# Patient Record
Sex: FEMALE | Race: WHITE | NOT HISPANIC OR LATINO | Employment: FULL TIME | URBAN - METROPOLITAN AREA
[De-identification: names, ages, dates, MRNs, and addresses within clinical notes are randomized per-mention and may not be internally consistent; named-entity substitution may affect disease eponyms.]

---

## 2023-04-13 ENCOUNTER — OFFICE VISIT (OUTPATIENT)
Dept: OBSTETRICS AND GYNECOLOGY | Facility: CLINIC | Age: 63
End: 2023-04-13
Payer: COMMERCIAL

## 2023-04-13 VITALS
HEART RATE: 83 BPM | DIASTOLIC BLOOD PRESSURE: 75 MMHG | SYSTOLIC BLOOD PRESSURE: 159 MMHG | WEIGHT: 164 LBS | BODY MASS INDEX: 27.32 KG/M2 | HEIGHT: 65 IN

## 2023-04-13 DIAGNOSIS — N95.2 VAGINAL ATROPHY: Primary | ICD-10-CM

## 2023-04-13 PROCEDURE — 3078F PR MOST RECENT DIASTOLIC BLOOD PRESSURE < 80 MM HG: ICD-10-PCS | Mod: CPTII,S$GLB,, | Performed by: OBSTETRICS & GYNECOLOGY

## 2023-04-13 PROCEDURE — 3078F DIAST BP <80 MM HG: CPT | Mod: CPTII,S$GLB,, | Performed by: OBSTETRICS & GYNECOLOGY

## 2023-04-13 PROCEDURE — 3008F PR BODY MASS INDEX (BMI) DOCUMENTED: ICD-10-PCS | Mod: CPTII,S$GLB,, | Performed by: OBSTETRICS & GYNECOLOGY

## 2023-04-13 PROCEDURE — 1159F MED LIST DOCD IN RCRD: CPT | Mod: CPTII,S$GLB,, | Performed by: OBSTETRICS & GYNECOLOGY

## 2023-04-13 PROCEDURE — 4010F PR ACE/ARB THEARPY RXD/TAKEN: ICD-10-PCS | Mod: CPTII,S$GLB,, | Performed by: OBSTETRICS & GYNECOLOGY

## 2023-04-13 PROCEDURE — 3077F SYST BP >= 140 MM HG: CPT | Mod: CPTII,S$GLB,, | Performed by: OBSTETRICS & GYNECOLOGY

## 2023-04-13 PROCEDURE — 4010F ACE/ARB THERAPY RXD/TAKEN: CPT | Mod: CPTII,S$GLB,, | Performed by: OBSTETRICS & GYNECOLOGY

## 2023-04-13 PROCEDURE — 3077F PR MOST RECENT SYSTOLIC BLOOD PRESSURE >= 140 MM HG: ICD-10-PCS | Mod: CPTII,S$GLB,, | Performed by: OBSTETRICS & GYNECOLOGY

## 2023-04-13 PROCEDURE — 3008F BODY MASS INDEX DOCD: CPT | Mod: CPTII,S$GLB,, | Performed by: OBSTETRICS & GYNECOLOGY

## 2023-04-13 PROCEDURE — 99203 PR OFFICE/OUTPT VISIT, NEW, LEVL III, 30-44 MIN: ICD-10-PCS | Mod: S$GLB,,, | Performed by: OBSTETRICS & GYNECOLOGY

## 2023-04-13 PROCEDURE — 1159F PR MEDICATION LIST DOCUMENTED IN MEDICAL RECORD: ICD-10-PCS | Mod: CPTII,S$GLB,, | Performed by: OBSTETRICS & GYNECOLOGY

## 2023-04-13 PROCEDURE — 99203 OFFICE O/P NEW LOW 30 MIN: CPT | Mod: S$GLB,,, | Performed by: OBSTETRICS & GYNECOLOGY

## 2023-04-13 RX ORDER — GABAPENTIN 100 MG/1
CAPSULE ORAL
COMMUNITY

## 2023-04-13 RX ORDER — SUMATRIPTAN SUCCINATE 100 MG/1
TABLET ORAL
COMMUNITY

## 2023-04-13 RX ORDER — ESTRADIOL 10 UG/1
INSERT VAGINAL
COMMUNITY
Start: 2022-12-19 | End: 2023-04-13 | Stop reason: SDUPTHER

## 2023-04-13 RX ORDER — METOPROLOL SUCCINATE 50 MG/1
TABLET, EXTENDED RELEASE ORAL
COMMUNITY
Start: 2023-03-30

## 2023-04-13 RX ORDER — LISINOPRIL 20 MG/1
TABLET ORAL
COMMUNITY
Start: 2023-03-30

## 2023-04-13 RX ORDER — CETIRIZINE HYDROCHLORIDE 10 MG/1
TABLET ORAL
COMMUNITY

## 2023-04-13 RX ORDER — PANTOPRAZOLE SODIUM 40 MG/1
TABLET, DELAYED RELEASE ORAL
COMMUNITY
Start: 2023-03-21

## 2023-04-13 RX ORDER — ALPRAZOLAM 0.25 MG/1
0.25 TABLET ORAL NIGHTLY
COMMUNITY
Start: 2023-03-10

## 2023-04-13 RX ORDER — VENLAFAXINE HYDROCHLORIDE 75 MG/1
75 CAPSULE, EXTENDED RELEASE ORAL
COMMUNITY
Start: 2023-02-02 | End: 2023-12-28 | Stop reason: SDUPTHER

## 2023-04-13 RX ORDER — ESTRADIOL 10 UG/1
10 INSERT VAGINAL
Qty: 24 TABLET | Refills: 2 | Status: SHIPPED | OUTPATIENT
Start: 2023-04-13 | End: 2023-10-05 | Stop reason: SDUPTHER

## 2023-04-13 NOTE — PROGRESS NOTES
Subjective:       Patient ID: Gaby Jaquez is a 62 y.o. female.    Chief Complaint:  Well Woman      History of Present Illness  She is doing well.  No new health issues,  No abnormal bleeding, No GI or Gu concerns,  No dyspareunia.   She is doing a vaginal insert  she is on effexor for hot flashes.   She however did have a heart concern but not a heart attach.  She had a grand child die from trisomy 18  she is doing well with an increase in the effexor to 75 mg now  she is on blood pressure meds  she is on lisinipril    and increase her metoprolol    No vag bleeding or  spotting.    She does not need an exam today     HPI      GYN & OB History  No LMP recorded. Patient is postmenopausal.     Date of Last Pap: No result found    OB History   No obstetric history on file.       Review of Systems  Review of Systems   Constitutional:  Negative for activity change.   Eyes:  Negative for visual disturbance.   Respiratory:  Negative for shortness of breath.    Cardiovascular:  Negative for chest pain.   Gastrointestinal:  Negative for abdominal pain.   Genitourinary:  Negative for vaginal bleeding.        No abnormal vaginal bleeding   Musculoskeletal:  Negative for back pain.   Integumentary:  Negative for rash and breast mass.   Neurological:  Negative for numbness.   Psychiatric/Behavioral:          No mood disturbance or changes    Breast: Negative for mass          Objective:    Physical Exam:   Constitutional: She is oriented to person, place, and time. She appears well-developed.                           Neurological: She is alert and oriented to person, place, and time. She has normal reflexes.     Psychiatric: She has a normal mood and affect. Her behavior is normal. Judgment and thought content normal.        Assessment:        1. Vaginal atrophy               Plan:         Rnt for her annual in 6 months     Mammogram  Follow up one year or sooner if needed  Self breast exams  Consider health profile if  labs not done with PCP  Recommend colonoscopy as indicated  Bone density discussed   Pt is aware we call all results. If she does not hear from our office regarding her result within a week of having a study or procedure performed she is to call the office so that we can research the result for her as I do not know when she is scheduling her procedures.   Chaperone was present

## 2023-10-05 ENCOUNTER — OFFICE VISIT (OUTPATIENT)
Dept: OBSTETRICS AND GYNECOLOGY | Facility: CLINIC | Age: 63
End: 2023-10-05
Payer: COMMERCIAL

## 2023-10-05 VITALS
DIASTOLIC BLOOD PRESSURE: 74 MMHG | BODY MASS INDEX: 27.29 KG/M2 | WEIGHT: 164 LBS | HEART RATE: 77 BPM | SYSTOLIC BLOOD PRESSURE: 129 MMHG

## 2023-10-05 DIAGNOSIS — Z12.31 BREAST CANCER SCREENING BY MAMMOGRAM: ICD-10-CM

## 2023-10-05 DIAGNOSIS — Z01.419 WELL WOMAN EXAM WITH ROUTINE GYNECOLOGICAL EXAM: Primary | ICD-10-CM

## 2023-10-05 DIAGNOSIS — Z12.11 SCREENING FOR COLON CANCER: ICD-10-CM

## 2023-10-05 PROCEDURE — 3008F PR BODY MASS INDEX (BMI) DOCUMENTED: ICD-10-PCS | Mod: CPTII,S$GLB,, | Performed by: OBSTETRICS & GYNECOLOGY

## 2023-10-05 PROCEDURE — 99396 PREV VISIT EST AGE 40-64: CPT | Mod: S$GLB,,, | Performed by: OBSTETRICS & GYNECOLOGY

## 2023-10-05 PROCEDURE — 3008F BODY MASS INDEX DOCD: CPT | Mod: CPTII,S$GLB,, | Performed by: OBSTETRICS & GYNECOLOGY

## 2023-10-05 PROCEDURE — 3074F PR MOST RECENT SYSTOLIC BLOOD PRESSURE < 130 MM HG: ICD-10-PCS | Mod: CPTII,S$GLB,, | Performed by: OBSTETRICS & GYNECOLOGY

## 2023-10-05 PROCEDURE — 4010F PR ACE/ARB THEARPY RXD/TAKEN: ICD-10-PCS | Mod: CPTII,S$GLB,, | Performed by: OBSTETRICS & GYNECOLOGY

## 2023-10-05 PROCEDURE — 3074F SYST BP LT 130 MM HG: CPT | Mod: CPTII,S$GLB,, | Performed by: OBSTETRICS & GYNECOLOGY

## 2023-10-05 PROCEDURE — 3078F DIAST BP <80 MM HG: CPT | Mod: CPTII,S$GLB,, | Performed by: OBSTETRICS & GYNECOLOGY

## 2023-10-05 PROCEDURE — 1159F MED LIST DOCD IN RCRD: CPT | Mod: CPTII,S$GLB,, | Performed by: OBSTETRICS & GYNECOLOGY

## 2023-10-05 PROCEDURE — 1159F PR MEDICATION LIST DOCUMENTED IN MEDICAL RECORD: ICD-10-PCS | Mod: CPTII,S$GLB,, | Performed by: OBSTETRICS & GYNECOLOGY

## 2023-10-05 PROCEDURE — 3078F PR MOST RECENT DIASTOLIC BLOOD PRESSURE < 80 MM HG: ICD-10-PCS | Mod: CPTII,S$GLB,, | Performed by: OBSTETRICS & GYNECOLOGY

## 2023-10-05 PROCEDURE — 4010F ACE/ARB THERAPY RXD/TAKEN: CPT | Mod: CPTII,S$GLB,, | Performed by: OBSTETRICS & GYNECOLOGY

## 2023-10-05 PROCEDURE — 99396 PR PREVENTIVE VISIT,EST,40-64: ICD-10-PCS | Mod: S$GLB,,, | Performed by: OBSTETRICS & GYNECOLOGY

## 2023-10-05 RX ORDER — SPIRONOLACTONE 25 MG/1
25 TABLET ORAL
COMMUNITY
Start: 2023-08-07

## 2023-10-05 RX ORDER — HYDROCHLOROTHIAZIDE 12.5 MG/1
12.5 CAPSULE ORAL
COMMUNITY
Start: 2023-08-28

## 2023-10-05 RX ORDER — ESTRADIOL 10 UG/1
10 INSERT VAGINAL
Qty: 24 TABLET | Refills: 2 | Status: SHIPPED | OUTPATIENT
Start: 2023-10-05

## 2023-10-05 NOTE — PROGRESS NOTES
Subjective:       Patient ID: Gaby Jaquez is a 63 y.o. female.    Chief Complaint:  Well Woman      History of Present Illness  She is doing well.  No new health issues,  No abnormal bleeding, No GI or Gu concerns,  No dyspareunia.   No bleeding or spotting   no concerns.    She is on her HRT   HPI      GYN & OB History  No LMP recorded. Patient is postmenopausal.     Date of Last Pap: No result found    OB History   No obstetric history on file.       Review of Systems  Review of Systems   Constitutional:  Negative for activity change.   Eyes:  Negative for visual disturbance.   Respiratory:  Negative for shortness of breath.    Cardiovascular:  Negative for chest pain.   Gastrointestinal:  Negative for abdominal pain.   Genitourinary:  Negative for vaginal bleeding.        No abnormal vaginal bleeding   Musculoskeletal:  Negative for back pain.   Integumentary:  Negative for rash and breast mass.   Neurological:  Negative for numbness.   Psychiatric/Behavioral:          No mood disturbance or changes    Breast: Negative for mass          Objective:    Physical Exam:   Constitutional: She is oriented to person, place, and time. She appears well-developed. She is cooperative.    HENT:   Head: Normocephalic.     Neck: Trachea normal. No thyromegaly present.    Cardiovascular:  Normal rate, regular rhythm and normal heart sounds.             Pulmonary/Chest: Effort normal and breath sounds normal. Right breast exhibits no mass, no nipple discharge and no skin change. Left breast exhibits no mass, no nipple discharge and no skin change.   Bilateral fibrocystic changes  During the exam self breast exams discussed         Abdominal: Soft. There is no abdominal tenderness. There is no rebound and no guarding.     Genitourinary:    Vagina, uterus and rectum normal.   Rectum:      Guaiac result negative.   Guaiac negative stool.    Pelvic exam was performed with patient supine.   Labial bartholins normal.There is no  lesion on the right labia. There is no lesion on the left labia. Cervix is normal. Right adnexum displays no mass and no tenderness. Left adnexum displays no mass and no tenderness. Cervix exhibits no discharge. Uterus is not enlarged and not tender.              Lymphadenopathy:        Head (right side): No submental and no submandibular adenopathy present.        Head (left side): No submental and no submandibular adenopathy present.     She has no cervical adenopathy.    Neurological: She is alert and oriented to person, place, and time.    Skin: Skin is warm.    Psychiatric: She has a normal mood and affect. Her speech is normal and behavior is normal. Thought content normal.        Assessment:        1. Well woman exam with routine gynecological exam    2. Breast cancer screening by mammogram    3. Screening for colon cancer               Plan:             Pap preformed   Mammogram  Follow up one year or sooner if needed  Self breast exams  Consider health profile if labs not done with PCP  Recommend colonoscopy as indicated  Bone density discussed   Pt is aware we call all results. If she does not hear from our office regarding her result within a week of having a study or procedure performed she is to call the office so that we can research the result for her as I do not know when she is scheduling her procedures.   Chaperone was present

## 2023-10-09 LAB — Lab: NORMAL

## 2023-12-28 RX ORDER — VENLAFAXINE HYDROCHLORIDE 75 MG/1
75 CAPSULE, EXTENDED RELEASE ORAL DAILY
Qty: 30 CAPSULE | Refills: 0 | Status: SHIPPED | OUTPATIENT
Start: 2023-12-28

## 2023-12-28 NOTE — TELEPHONE ENCOUNTER
----- Message from Yessica Loja sent at 12/28/2023  4:43 PM CST -----  Contact: Patient  Type:  RX Refill Request    Who Called: Gaby   Refill or New Rx:refill  RX Name and Strength:venlafaxine (EFFEXOR-XR) 37.5 MG 24 hr capsule  How is the patient currently taking it? (ex. 1XDay):1xday  Is this a 30 day or 90 day RX:90  Preferred Pharmacy with phone number:  [facility] - [place]  [place] [address]  [place] [address]  Phone: [phone] Fax: [phone]  Local or Mail Order:local   Ordering Provider:Dr. Soria   Would the patient rather a call back or a response via [facility]? Call back   Best Call Back Number:[phone]  Additional Information:

## 2024-01-04 DIAGNOSIS — R23.2 HOT FLASHES: Primary | ICD-10-CM

## 2024-01-04 DIAGNOSIS — Z78.0 MENOPAUSE: ICD-10-CM

## 2024-01-04 RX ORDER — VENLAFAXINE HYDROCHLORIDE 37.5 MG/1
37.5 CAPSULE, EXTENDED RELEASE ORAL DAILY
Qty: 90 CAPSULE | Refills: 3 | Status: SHIPPED | OUTPATIENT
Start: 2024-01-04 | End: 2025-01-03

## 2024-01-04 NOTE — TELEPHONE ENCOUNTER
----- Message from Laila Lee sent at 1/3/2024  3:17 PM CST -----  Type:  Needs Medical Advice  Who Called: Gaby Lemons  Symptoms (please be specific): -   How long has patient had these symptoms:  -  Pharmacy name and phone #:  -  Would the patient rather a call back or a response via [facility]?    Best Call Back Number: [phone]  Additional Information:  pt needs to speak w/ nurse about a medication venlafaxine (EFFEXOR-XR) 75 MG 24 hr capsule

## 2024-02-19 ENCOUNTER — DOCUMENTATION ONLY (OUTPATIENT)
Dept: OBSTETRICS AND GYNECOLOGY | Facility: CLINIC | Age: 64
End: 2024-02-19
Payer: COMMERCIAL

## 2024-08-16 ENCOUNTER — TELEPHONE (OUTPATIENT)
Dept: OBSTETRICS AND GYNECOLOGY | Facility: CLINIC | Age: 64
End: 2024-08-16
Payer: COMMERCIAL

## 2024-08-16 NOTE — TELEPHONE ENCOUNTER
----- Message from Anaid Ovalle sent at 8/16/2024 11:15 AM CDT -----  Regarding: soon appt  Contact: Jeremiah  .Type:  Sooner Apoointment Request    Caller is requesting a sooner appointment.  Caller declined first available appointment listed below.  Caller will not accept being placed on the waitlist and is requesting a message be sent to doctor.  Name of Caller: Gaby   When is the first available appointment?  Symptoms:  Would the patient rather a call back or a response via [facility]?call   Best Call Back Number: [phone] (home)    Additional Information:  Gaby is requesting a callback from the MA to be rescheduled. Epic did not have a date in 2024 available.

## 2024-11-05 ENCOUNTER — TELEPHONE (OUTPATIENT)
Dept: OBSTETRICS AND GYNECOLOGY | Facility: CLINIC | Age: 64
End: 2024-11-05
Payer: COMMERCIAL

## 2024-11-05 NOTE — TELEPHONE ENCOUNTER
----- Message from Laila sent at 11/5/2024 11:52 AM CST -----  Type:  Needs Medical Advice    Who Called: Gabykelly Jaquez    Symptoms (please be specific): Hives   How long has patient had these symptoms:  -  Pharmacy name and phone #:  -  Would the patient rather a call back or a response via [facility]?    Best Call Back Number: [phone]    Additional Information:  pt wants to know if she should sill come in for her appt please advise

## 2024-11-05 NOTE — TELEPHONE ENCOUNTER
Spoke to patient, she states she has been fighting hives for about 4 weeks now. Her PCP is sending her to see an allergist on Nov. 21st. She wants to reschedule appt until she is able to see them and make sure it cannot be spread.   Patient is rescheduled.

## 2024-12-06 ENCOUNTER — OFFICE VISIT (OUTPATIENT)
Dept: OBSTETRICS AND GYNECOLOGY | Facility: CLINIC | Age: 64
End: 2024-12-06
Payer: COMMERCIAL

## 2024-12-06 VITALS
HEART RATE: 71 BPM | WEIGHT: 170 LBS | DIASTOLIC BLOOD PRESSURE: 72 MMHG | SYSTOLIC BLOOD PRESSURE: 107 MMHG | BODY MASS INDEX: 28.29 KG/M2

## 2024-12-06 DIAGNOSIS — Z78.0 MENOPAUSE: ICD-10-CM

## 2024-12-06 DIAGNOSIS — R23.2 HOT FLASHES: ICD-10-CM

## 2024-12-06 DIAGNOSIS — Z12.31 BREAST CANCER SCREENING BY MAMMOGRAM: ICD-10-CM

## 2024-12-06 DIAGNOSIS — N95.2 VAGINAL ATROPHY: Primary | ICD-10-CM

## 2024-12-06 RX ORDER — NITROGLYCERIN 0.4 MG/1
TABLET SUBLINGUAL
COMMUNITY
Start: 2024-08-20

## 2024-12-06 RX ORDER — CAMPHOR AND MENTHOL 5; 5 MG/ML; MG/ML
LOTION TOPICAL
COMMUNITY
Start: 2024-10-07

## 2024-12-06 RX ORDER — ESTRADIOL 10 UG/1
10 INSERT VAGINAL
Qty: 24 TABLET | Refills: 2 | Status: SHIPPED | OUTPATIENT
Start: 2024-12-09

## 2024-12-06 RX ORDER — METOPROLOL SUCCINATE 100 MG/1
100 TABLET, EXTENDED RELEASE ORAL
COMMUNITY
Start: 2024-11-08

## 2024-12-06 RX ORDER — CLOTRIMAZOLE AND BETAMETHASONE DIPROPIONATE 10; .64 MG/G; MG/G
CREAM TOPICAL
COMMUNITY
Start: 2024-09-15

## 2024-12-06 RX ORDER — VENLAFAXINE HYDROCHLORIDE 37.5 MG/1
37.5 CAPSULE, EXTENDED RELEASE ORAL DAILY
Qty: 90 CAPSULE | Refills: 3 | Status: SHIPPED | OUTPATIENT
Start: 2024-12-06 | End: 2025-12-06

## 2024-12-06 RX ORDER — TRIAMCINOLONE ACETONIDE 1 MG/G
CREAM TOPICAL
COMMUNITY
Start: 2024-11-22

## 2024-12-06 NOTE — PROGRESS NOTES
Subjective:       Patient ID: Gaby Jaquez is a 64 y.o. female.    Chief Complaint:  Well Woman      History of Present Illness  She is doing well.  No new health issues,  No abnormal bleeding, No GI or Gu concerns,  No dyspareunia.   No vag bleeding or spotting.    She is on her vaginal insert and she likes it   no dyspareunia.    She is on the venlafaxine for her hot flashes and this works well.   She does have skin irritation all over    HPI      GYN & OB History  No LMP recorded. Patient is postmenopausal.     Date of Last Pap: 10/9/2023    OB History   No obstetric history on file.       Review of Systems  Review of Systems   Constitutional:  Negative for activity change.   Eyes:  Negative for visual disturbance.   Respiratory:  Negative for shortness of breath.    Cardiovascular:  Negative for chest pain.   Gastrointestinal:  Negative for abdominal pain.   Genitourinary:  Negative for vaginal bleeding.        No abnormal vaginal bleeding   Musculoskeletal:  Negative for back pain.   Integumentary:  Negative for rash and breast mass.   Neurological:  Negative for numbness.   Psychiatric/Behavioral:          No mood disturbance or changes    Breast: Negative for mass            Objective:    Physical Exam:   Constitutional: She is oriented to person, place, and time. She appears well-developed. She is cooperative.    HENT:   Head: Normocephalic.     Neck: Trachea normal. No thyromegaly present.    Cardiovascular:  Normal rate, regular rhythm and normal heart sounds.             Pulmonary/Chest: Effort normal and breath sounds normal. Right breast exhibits no mass, no nipple discharge and no skin change. Left breast exhibits no mass, no nipple discharge and no skin change.        Abdominal: Soft. There is no abdominal tenderness. There is no rebound and no guarding.     Genitourinary:    Vagina and uterus normal.      Pelvic exam was performed with patient supine.     Labial bartholins normal.There is no  lesion on the right labia. There is no lesion on the left labia. Cervix is normal. Right adnexum displays no mass and no tenderness. Left adnexum displays no mass and no tenderness. Cervix exhibits no discharge. Uterus is not enlarged and not tender.              Lymphadenopathy:        Head (right side): No submental and no submandibular adenopathy present.        Head (left side): No submental and no submandibular adenopathy present.     She has no cervical adenopathy.    Neurological: She is alert and oriented to person, place, and time.    Skin: Skin is warm.    Psychiatric: She has a normal mood and affect. Her speech is normal and behavior is normal. Thought content normal.          Assessment:        1. Vaginal atrophy    2. Hot flashes    3. Menopause    4. Breast cancer screening by mammogram               Plan:             Pap not done     Mammogram  Follow up one year or sooner if needed  Self breast exams  Consider health profile if labs not done with PCP  Recommend colonoscopy as indicated  Bone density discussed   Pt is aware we call all results. If she does not hear from our office regarding her result within a week of having a study or procedure performed she is to call the office so that we can research the result for her as I do not know when she is scheduling her procedures.   Chaperone was present

## 2025-02-26 ENCOUNTER — DOCUMENTATION ONLY (OUTPATIENT)
Dept: OBSTETRICS AND GYNECOLOGY | Facility: CLINIC | Age: 65
End: 2025-02-26
Payer: COMMERCIAL